# Patient Record
Sex: FEMALE | Race: WHITE | URBAN - METROPOLITAN AREA
[De-identification: names, ages, dates, MRNs, and addresses within clinical notes are randomized per-mention and may not be internally consistent; named-entity substitution may affect disease eponyms.]

---

## 2024-07-08 ENCOUNTER — APPOINTMENT (RX ONLY)
Dept: URBAN - METROPOLITAN AREA CLINIC 26 | Facility: CLINIC | Age: 64
Setting detail: DERMATOLOGY
End: 2024-07-08

## 2024-07-08 DIAGNOSIS — Z41.9 ENCOUNTER FOR PROCEDURE FOR PURPOSES OTHER THAN REMEDYING HEALTH STATE, UNSPECIFIED: ICD-10-CM

## 2024-07-08 PROCEDURE — ? COSMETIC CONSULTATION: BOTOX

## 2024-07-08 PROCEDURE — ? BOTOX

## 2024-07-08 ASSESSMENT — LOCATION DETAILED DESCRIPTION DERM
LOCATION DETAILED: GLABELLA
LOCATION DETAILED: LEFT MEDIAL ZYGOMA
LOCATION DETAILED: RIGHT INFERIOR FOREHEAD
LOCATION DETAILED: LEFT FOREHEAD
LOCATION DETAILED: LEFT LATERAL FOREHEAD
LOCATION DETAILED: LEFT MEDIAL FOREHEAD
LOCATION DETAILED: RIGHT LATERAL FOREHEAD
LOCATION DETAILED: RIGHT MEDIAL ZYGOMA
LOCATION DETAILED: LEFT INFERIOR MEDIAL FOREHEAD
LOCATION DETAILED: LEFT INFERIOR FOREHEAD
LOCATION DETAILED: RIGHT INFERIOR MEDIAL FOREHEAD
LOCATION DETAILED: RIGHT FOREHEAD

## 2024-07-08 ASSESSMENT — LOCATION SIMPLE DESCRIPTION DERM
LOCATION SIMPLE: RIGHT FOREHEAD
LOCATION SIMPLE: RIGHT ZYGOMA
LOCATION SIMPLE: GLABELLA
LOCATION SIMPLE: LEFT FOREHEAD
LOCATION SIMPLE: LEFT ZYGOMA

## 2024-07-08 ASSESSMENT — LOCATION ZONE DERM: LOCATION ZONE: FACE

## 2024-07-08 NOTE — PROCEDURE: BOTOX
Additional Area 2 Units: 0
Show Nasal Units: Yes
Incrementing Botox Units: By 0.5 Units
Expiration Date (Month Year): 26/06
Forehead Units: 8
Consent: Written consent obtained. Risks include but not limited to lid/brow ptosis, bruising, swelling, diplopia, temporary effect, incomplete chemical denervation.
Price (Use Numbers Only, No Special Characters Or $): 405
Detail Level: Detailed
Glabellar Complex Units: 16
Post-Care Instructions: Patient instructed to not lie down for 4 hours and limit physical activity for 24 hours.
Show Ucl Units: No
Lot #: [id number]
Dilution (U/0.1 Cc): 4
Inferior Lateral Orbicularis Oculi Units: 6
Patient Specific Comments (Will Not Stick From Patient To Patient): 10% discount deal for 30 or more units (original price $450, discounted price $405)

## 2024-11-19 ENCOUNTER — APPOINTMENT (RX ONLY)
Dept: URBAN - METROPOLITAN AREA CLINIC 26 | Facility: CLINIC | Age: 64
Setting detail: DERMATOLOGY
End: 2024-11-19

## 2024-11-19 DIAGNOSIS — Z41.9 ENCOUNTER FOR PROCEDURE FOR PURPOSES OTHER THAN REMEDYING HEALTH STATE, UNSPECIFIED: ICD-10-CM

## 2024-11-19 PROCEDURE — ? BOTOX

## 2024-11-19 PROCEDURE — ? COSMETIC CONSULTATION - PULSED-DYE LASER

## 2024-11-19 PROCEDURE — ? ADDITIONAL NOTES

## 2024-11-19 PROCEDURE — ? COSMETIC CONSULTATION: BOTOX

## 2024-11-19 PROCEDURE — ? PHOTO-DOCUMENTATION

## 2024-11-19 ASSESSMENT — LOCATION SIMPLE DESCRIPTION DERM
LOCATION SIMPLE: INFERIOR FOREHEAD
LOCATION SIMPLE: LEFT FOREHEAD
LOCATION SIMPLE: RIGHT CHEEK
LOCATION SIMPLE: LEFT TEMPLE
LOCATION SIMPLE: LEFT CHEEK
LOCATION SIMPLE: GLABELLA
LOCATION SIMPLE: RIGHT EYELID
LOCATION SIMPLE: RIGHT TEMPLE
LOCATION SIMPLE: RIGHT FOREHEAD
LOCATION SIMPLE: LEFT EYELID
LOCATION SIMPLE: LEFT CHEEK

## 2024-11-19 ASSESSMENT — LOCATION DETAILED DESCRIPTION DERM
LOCATION DETAILED: RIGHT SUPERIOR CENTRAL MALAR CHEEK
LOCATION DETAILED: LEFT FOREHEAD
LOCATION DETAILED: INFERIOR MID FOREHEAD
LOCATION DETAILED: RIGHT INFERIOR FOREHEAD
LOCATION DETAILED: LEFT INFERIOR TEMPLE
LOCATION DETAILED: RIGHT INFERIOR TEMPLE
LOCATION DETAILED: LEFT INFERIOR MEDIAL FOREHEAD
LOCATION DETAILED: RIGHT INFERIOR MEDIAL FOREHEAD
LOCATION DETAILED: RIGHT FOREHEAD
LOCATION DETAILED: GLABELLA
LOCATION DETAILED: RIGHT SUPERIOR MEDIAL FOREHEAD
LOCATION DETAILED: LEFT INFERIOR CENTRAL MALAR CHEEK
LOCATION DETAILED: LEFT LATERAL CANTHUS
LOCATION DETAILED: RIGHT INFERIOR LATERAL FOREHEAD
LOCATION DETAILED: LEFT INFERIOR FOREHEAD
LOCATION DETAILED: RIGHT LATERAL CANTHUS
LOCATION DETAILED: LEFT INFERIOR LATERAL FOREHEAD
LOCATION DETAILED: LEFT SUPERIOR CENTRAL MALAR CHEEK

## 2024-11-19 ASSESSMENT — LOCATION ZONE DERM
LOCATION ZONE: EYELID
LOCATION ZONE: FACE
LOCATION ZONE: FACE

## 2024-11-19 NOTE — PROCEDURE: COSMETIC CONSULTATION - PULSED-DYE LASER
Consultation Charge $ (Use Numbers Only, No Text Please.): 50
Detail Level: Zone
Send Procedure Quote As Charge: No

## 2024-11-19 NOTE — PROCEDURE: BOTOX
Dilution (U/0.1 Cc): 4
Mentalis Units: 0
Show Glabellar Units: Yes
Glabellar Complex Units: 16
Show Lcl Units: No
Periorbital Skin Units: 12
Patient Specific Comments (Will Not Stick From Patient To Patient): 16u glabella, 8u forehead, 12u periocular (6u each side)\\n$13 per unit for special
Expiration Date (Month Year): 2026/09
Consent: Written consent obtained. Risks include but not limited to lid/brow ptosis, bruising, swelling, diplopia, temporary effect, incomplete chemical denervation.
Price (Use Numbers Only, No Special Characters Or $): 406
Incrementing Botox Units: By 0.5 Units
Detail Level: Zone
Lot #: [id number]
Forehead Units: 8
Post-Care Instructions: Patient instructed to not lie down for 4 hours and limit physical activity for 24 hours.

## 2024-11-19 NOTE — PROCEDURE: ADDITIONAL NOTES
Detail Level: Zone
Additional Notes: Whole face, 450, prominent nasal and chin vessels\\nPDL 2-3 treatment sessions, discussed need for maintenance treatment\\nHad prior ?burning procedure in [place] that she did not feel was helpful
Render Risk Assessment In Note?: no

## 2024-12-17 ENCOUNTER — APPOINTMENT (OUTPATIENT)
Dept: URBAN - METROPOLITAN AREA CLINIC 26 | Facility: CLINIC | Age: 64
Setting detail: DERMATOLOGY
End: 2024-12-17

## 2024-12-17 DIAGNOSIS — Z41.9 ENCOUNTER FOR PROCEDURE FOR PURPOSES OTHER THAN REMEDYING HEALTH STATE, UNSPECIFIED: ICD-10-CM

## 2024-12-17 PROCEDURE — ? ADDITIONAL NOTES

## 2024-12-17 PROCEDURE — ? PULSED-DYE LASER

## 2024-12-17 ASSESSMENT — LOCATION SIMPLE DESCRIPTION DERM: LOCATION SIMPLE: LEFT CHEEK

## 2024-12-17 ASSESSMENT — LOCATION ZONE DERM: LOCATION ZONE: FACE

## 2024-12-17 ASSESSMENT — LOCATION DETAILED DESCRIPTION DERM: LOCATION DETAILED: LEFT INFERIOR CENTRAL MALAR CHEEK

## 2024-12-17 NOTE — PROCEDURE: ADDITIONAL NOTES
Detail Level: Zone
Additional Notes: Some difficulty tolerating the procedure 2/2 pain tolerance\\nWill call prn for next treatment; discussed chronic condition, need for multiple treatments
Render Risk Assessment In Note?: no

## 2024-12-17 NOTE — PROCEDURE: PULSED-DYE LASER
Immediate Endpoint: erythema
Pulse Duration: 10 ms
Spot Size: 10 mm
Cryogen Time (Ms): 30
Spot Size: 7 mm
Delay Time (Ms): 20
Post-Procedure Care: Vaseline and ice applied. Post care reviewed with patient.
Consent: Written consent obtained, risks reviewed including but not limited to crusting, scabbing, blistering, scarring, darker or lighter pigmentary change, incidental hair removal, bruising, and/or incomplete removal.
Detail Level: Zone
Fluence In J/Cm2 (Optional): 7.5
Price (Use Numbers Only, No Special Characters Or $): 450
Post-Care Instructions: I reviewed with the patient in detail post-care instructions. Patient should stay away from the sun and wear sun protection until treated areas are fully healed.
Laser Type: Vbeam 595nm

## 2025-01-21 ENCOUNTER — APPOINTMENT (OUTPATIENT)
Dept: URBAN - METROPOLITAN AREA CLINIC 26 | Facility: CLINIC | Age: 65
Setting detail: DERMATOLOGY
End: 2025-01-21

## 2025-01-21 DIAGNOSIS — Z41.9 ENCOUNTER FOR PROCEDURE FOR PURPOSES OTHER THAN REMEDYING HEALTH STATE, UNSPECIFIED: ICD-10-CM

## 2025-01-21 PROCEDURE — ? ADDITIONAL NOTES

## 2025-01-21 PROCEDURE — ? PULSED-DYE LASER

## 2025-01-21 ASSESSMENT — LOCATION ZONE DERM: LOCATION ZONE: FACE

## 2025-01-21 ASSESSMENT — LOCATION SIMPLE DESCRIPTION DERM: LOCATION SIMPLE: LEFT CHEEK

## 2025-01-21 ASSESSMENT — LOCATION DETAILED DESCRIPTION DERM: LOCATION DETAILED: LEFT INFERIOR CENTRAL MALAR CHEEK

## 2025-01-21 NOTE — PROCEDURE: ADDITIONAL NOTES
Detail Level: Zone
Additional Notes: Some difficulty tolerating the procedure 2/2 pain tolerance\\n8/6/10 for nasal telangiectasias\\nWill call prn for next treatment; discussed chronic condition, need for multiple treatments. Pt declines increase in setting for nasal telangiectasias.
Render Risk Assessment In Note?: no

## 2025-06-03 ENCOUNTER — APPOINTMENT (OUTPATIENT)
Dept: URBAN - METROPOLITAN AREA CLINIC 26 | Facility: CLINIC | Age: 65
Setting detail: DERMATOLOGY
End: 2025-06-03

## 2025-06-03 DIAGNOSIS — Z41.9 ENCOUNTER FOR PROCEDURE FOR PURPOSES OTHER THAN REMEDYING HEALTH STATE, UNSPECIFIED: ICD-10-CM

## 2025-06-03 PROCEDURE — ? COSMETIC CONSULTATION: BOTOX

## 2025-06-03 PROCEDURE — ? BOTOX

## 2025-06-03 PROCEDURE — ? PHOTO-DOCUMENTATION

## 2025-06-03 ASSESSMENT — LOCATION DETAILED DESCRIPTION DERM
LOCATION DETAILED: LEFT CORRUGATOR SUPERCILIARIS
LOCATION DETAILED: LEFT ORBICULARIS OCULI
LOCATION DETAILED: RIGHT ORBICULARIS OCULI
LOCATION DETAILED: RIGHT FRONTALIS
LOCATION DETAILED: LEFT FRONTALIS
LOCATION DETAILED: RIGHT CORRUGATOR SUPERCILIARIS
LOCATION DETAILED: PROCERUS

## 2025-06-03 ASSESSMENT — LOCATION SIMPLE DESCRIPTION DERM
LOCATION SIMPLE: RIGHT FRONTALIS
LOCATION SIMPLE: LEFT ORBICULARIS OCULI
LOCATION SIMPLE: RIGHT CORRUGATOR SUPERCILIARIS
LOCATION SIMPLE: PROCERUS
LOCATION SIMPLE: LEFT FRONTALIS
LOCATION SIMPLE: RIGHT ORBICULARIS OCULI
LOCATION SIMPLE: LEFT CORRUGATOR SUPERCILIARIS

## 2025-06-03 ASSESSMENT — LOCATION ZONE DERM: LOCATION ZONE: FACE

## 2025-06-03 NOTE — PROCEDURE: BOTOX
Dilution (U/0.1 Cc): 4
Mentalis Units: 0
Show Glabellar Units: Yes
Glabellar Complex Units: 16
Show Lcl Units: No
Periorbital Skin Units: 12
Patient Specific Comments (Will Not Stick From Patient To Patient): 16u glabella, 8u forehead, 12u periocular (6u each side)
Expiration Date (Month Year): 2027/03
Consent: Written consent obtained. Risks include but not limited to lid/brow ptosis, bruising, swelling, diplopia, temporary effect, incomplete chemical denervation.
Price (Use Numbers Only, No Special Characters Or $): 922
Incrementing Botox Units: By 0.5 Units
Detail Level: Zone
Lot #: [id number]
Forehead Units: 8
Post-Care Instructions: Patient instructed to not lie down for 4 hours and limit physical activity for 24 hours.

## 2025-06-03 NOTE — PROCEDURE: PHOTO-DOCUMENTATION
My recommendations for an upper respiratory infection:  - Use a humidifier, especially at night. Cold or warm water humidifiers have the same effect.  - Je Med squeeze bottle sinus rinses twice a day.  - Hot tea + honey + fresh lemon juice  - Throat Coat herbal tea  - Honey by itself has been shown to help fight bugs and provide cough relief  - Chicken noodle soup has also been shown to help fight bugs  - Drink plenty of water  - Vitamin C supplementation   - Tylenol (no more than 3,000 mg in a day) as needed  - Ibuprofen (no more than 2,400 mg in a day) as needed   
Detail Level: Detailed
Photo Preface (Leave Blank If You Do Not Want): Photographs were obtained today